# Patient Record
Sex: MALE | Race: OTHER | NOT HISPANIC OR LATINO | URBAN - METROPOLITAN AREA
[De-identification: names, ages, dates, MRNs, and addresses within clinical notes are randomized per-mention and may not be internally consistent; named-entity substitution may affect disease eponyms.]

---

## 2018-07-27 ENCOUNTER — EMERGENCY (EMERGENCY)
Facility: HOSPITAL | Age: 28
LOS: 1 days | Discharge: HOME | End: 2018-07-27
Admitting: EMERGENCY MEDICINE
Payer: SELF-PAY

## 2018-07-27 VITALS
TEMPERATURE: 98 F | SYSTOLIC BLOOD PRESSURE: 115 MMHG | HEART RATE: 106 BPM | RESPIRATION RATE: 16 BRPM | DIASTOLIC BLOOD PRESSURE: 67 MMHG | OXYGEN SATURATION: 98 %

## 2018-07-27 LAB
AMPHET UR-MCNC: NEGATIVE — SIGNIFICANT CHANGE UP
BARBITURATES UR SCN-MCNC: NEGATIVE — SIGNIFICANT CHANGE UP
BENZODIAZ UR-MCNC: NEGATIVE — SIGNIFICANT CHANGE UP
CANNABINOIDS UR-MCNC: NEGATIVE — SIGNIFICANT CHANGE UP
COCAINE METAB.OTHER UR-MCNC: NEGATIVE — SIGNIFICANT CHANGE UP
METHADONE UR-MCNC: NEGATIVE — SIGNIFICANT CHANGE UP
OPIATES UR-MCNC: NEGATIVE — SIGNIFICANT CHANGE UP
OXYCODONE UR-MCNC: NEGATIVE — SIGNIFICANT CHANGE UP
PCP UR-MCNC: NEGATIVE — SIGNIFICANT CHANGE UP

## 2018-07-27 PROCEDURE — 99284 EMERGENCY DEPT VISIT MOD MDM: CPT

## 2018-07-27 RX ORDER — HALOPERIDOL DECANOATE 100 MG/ML
5 INJECTION INTRAMUSCULAR ONCE
Qty: 0 | Refills: 0 | Status: COMPLETED | OUTPATIENT
Start: 2018-07-27 | End: 2018-07-27

## 2018-07-27 RX ORDER — ONDANSETRON 8 MG/1
4 TABLET, FILM COATED ORAL ONCE
Qty: 0 | Refills: 0 | Status: COMPLETED | OUTPATIENT
Start: 2018-07-27 | End: 2018-07-27

## 2018-07-27 RX ADMIN — ONDANSETRON 4 MILLIGRAM(S): 8 TABLET, FILM COATED ORAL at 22:06

## 2018-07-27 RX ADMIN — HALOPERIDOL DECANOATE 5 MILLIGRAM(S): 100 INJECTION INTRAMUSCULAR at 16:26

## 2018-07-27 NOTE — ED PROVIDER NOTE - OBJECTIVE STATEMENT
27 y/o M  hx BIBA  w c/o agitation and aggression while making a statement.    Denies falling ,punching or kicking any objects. Denies  pain, SOB, fever, chills , chest/abdominal discomfort. Denies SI/HI/AH/VH.  Denies recent use of alcohol or illicit drug 27 y/o M  hx Polysubstance Abuse w c/o  agitation and  acting out behaviors. Admit to using K2 this morning. States " I was visiting a friend and he gave me K2 and 2 shots of liquor". Denies falling ,punching or kicking any objects. Denies  pain, SOB, fever, chills , chest/abdominal discomfort. Denies SI/HI/AH/VH.  Denies recent use of other illicit drug.

## 2018-07-27 NOTE — ED ADULT NURSE REASSESSMENT NOTE - NS ED NURSE REASSESS COMMENT FT1
Pt discharged by NP. Pt c/o mild nausea, NP made aware and meds as ordered. Pt reported relief of nausea before discharge; able to walk with steady gait; received discharge instructions from MD.

## 2018-07-27 NOTE — ED PROVIDER NOTE - MEDICAL DECISION MAKING DETAILS
27 y/o M  hx Polysubstance Abuse  Urine Tox.  Medical evaluation performed. There is no clinical evidence of any acute medical problem requiring immediate intervention. List of detox facilities provided.   Recommend following up with the [facility].

## 2018-07-27 NOTE — ED ADULT TRIAGE NOTE - CHIEF COMPLAINT QUOTE
found on street intoxicated, denies any drug use. found on street intoxicated, denies any drug use.  Denies any PMH.

## 2018-07-27 NOTE — ED ADULT NURSE NOTE - OBJECTIVE STATEMENT
Break Coverage RN: The patient is a 29 y/o male BIB EMS agitated with staff.  As per EMS patient was found wandering the streets intoxicated.  Patient minimally cooperative with interview, hostile and agitated with this author.  Patient denying SI/HI/AH/VH, denies psychotic symptoms, denies manic symptoms.  Patient denies medical complaints, denies pain and discomfort.  Patient placed in hospital gowns, search conducted by NGA Ibarra.  Patient in nad, respirations unlabored, evaluated by Tej TRACY.

## 2019-02-16 ENCOUNTER — EMERGENCY (EMERGENCY)
Facility: HOSPITAL | Age: 29
LOS: 1 days | Discharge: HOME | End: 2019-02-16
Attending: STUDENT IN AN ORGANIZED HEALTH CARE EDUCATION/TRAINING PROGRAM
Payer: SELF-PAY

## 2019-02-16 VITALS
TEMPERATURE: 100 F | DIASTOLIC BLOOD PRESSURE: 89 MMHG | OXYGEN SATURATION: 99 % | RESPIRATION RATE: 18 BRPM | SYSTOLIC BLOOD PRESSURE: 135 MMHG | HEIGHT: 67 IN | HEART RATE: 91 BPM | WEIGHT: 164.91 LBS

## 2019-02-16 PROCEDURE — 99282 EMERGENCY DEPT VISIT SF MDM: CPT

## 2019-02-16 NOTE — ED PROVIDER NOTE - CLINICAL SUMMARY MEDICAL DECISION MAKING FREE TEXT BOX
27 y/o male presents with concern for fever. Pt with stable vitals and afebrile in ED. Cough, body aches likely viral. Otherwise, digit well appearing, swelling secondary to trauma. Does not appear infected. Return precautions given. Signs to look for infection given and will discharge with PMD follow up.

## 2019-02-16 NOTE — ED PROVIDER NOTE - OBJECTIVE STATEMENT
29 y/o male presents to the ED with c/o subjective fever for the past few days after injuring right middle finger at work 2 weeks ago. Pt states that he awoke this morning to find his bed very wet. Pt also note nasal congestion and cough for the past few days. Pt has had several hospital visits to have his finger examined. The first was at [facility], where he went to treat a hematoma within the finger. Pt then went to [facility] because the wound had not healed. Pt presents today due to concern for infection. Pt also states he has been very lethargic recently and unable to get out of bed.

## 2019-02-16 NOTE — ED PROVIDER NOTE - MUSCULOSKELETAL, MLM
Right middle finger with subungual hematoma and mild swelling. No erythema, warmth, or streaking up the arm from the finger.

## 2019-02-16 NOTE — ED ADULT NURSE NOTE - NSIMPLEMENTINTERV_GEN_ALL_ED
Implemented All Universal Safety Interventions:  [place] to call system. Call bell, personal items and telephone within reach. Instruct patient to call for assistance. Room bathroom lighting operational. Non-slip footwear when patient is off stretcher. Physically safe environment: no spills, clutter or unnecessary equipment. Stretcher in lowest position, wheels locked, appropriate side rails in place.

## 2019-02-17 PROBLEM — F19.10 OTHER PSYCHOACTIVE SUBSTANCE ABUSE, UNCOMPLICATED: Chronic | Status: ACTIVE | Noted: 2018-08-02
